# Patient Record
Sex: FEMALE | Race: OTHER | URBAN - METROPOLITAN AREA
[De-identification: names, ages, dates, MRNs, and addresses within clinical notes are randomized per-mention and may not be internally consistent; named-entity substitution may affect disease eponyms.]

---

## 2020-07-27 ENCOUNTER — TELEPHONE (OUTPATIENT)
Dept: FAMILY MEDICINE CLINIC | Facility: CLINIC | Age: 85
End: 2020-07-27

## 2020-07-27 ENCOUNTER — OFFICE VISIT (OUTPATIENT)
Dept: FAMILY MEDICINE CLINIC | Facility: CLINIC | Age: 85
End: 2020-07-27
Payer: COMMERCIAL

## 2020-07-27 VITALS
SYSTOLIC BLOOD PRESSURE: 126 MMHG | BODY MASS INDEX: 31.28 KG/M2 | DIASTOLIC BLOOD PRESSURE: 80 MMHG | HEIGHT: 58 IN | OXYGEN SATURATION: 98 % | RESPIRATION RATE: 18 BRPM | WEIGHT: 149 LBS | HEART RATE: 60 BPM

## 2020-07-27 DIAGNOSIS — M79.89 SWELLING OF LOWER LEG: ICD-10-CM

## 2020-07-27 DIAGNOSIS — R20.2 TINGLING OF BOTH FEET: ICD-10-CM

## 2020-07-27 DIAGNOSIS — R01.1 HEART MURMUR: ICD-10-CM

## 2020-07-27 DIAGNOSIS — H40.9 GLAUCOMA OF BOTH EYES, UNSPECIFIED GLAUCOMA TYPE: ICD-10-CM

## 2020-07-27 DIAGNOSIS — I49.9 CARDIAC ARRHYTHMIA, UNSPECIFIED CARDIAC ARRHYTHMIA TYPE: ICD-10-CM

## 2020-07-27 DIAGNOSIS — I10 ESSENTIAL HYPERTENSION: ICD-10-CM

## 2020-07-27 DIAGNOSIS — M17.0 OSTEOARTHRITIS OF BOTH KNEES, UNSPECIFIED OSTEOARTHRITIS TYPE: Primary | ICD-10-CM

## 2020-07-27 PROCEDURE — 99204 OFFICE O/P NEW MOD 45 MIN: CPT | Performed by: FAMILY MEDICINE

## 2020-07-27 PROCEDURE — 3008F BODY MASS INDEX DOCD: CPT | Performed by: FAMILY MEDICINE

## 2020-07-27 PROCEDURE — 3079F DIAST BP 80-89 MM HG: CPT | Performed by: FAMILY MEDICINE

## 2020-07-27 PROCEDURE — 3074F SYST BP LT 130 MM HG: CPT | Performed by: FAMILY MEDICINE

## 2020-07-27 RX ORDER — TELMISARTAN AND HYDROCHLORTHIAZIDE 80; 12.5 MG/1; MG/1
1 TABLET ORAL DAILY
COMMUNITY
End: 2020-08-06

## 2020-07-27 RX ORDER — DORZOLAMIDE HYDROCHLORIDE AND TIMOLOL MALEATE 20; 5 MG/ML; MG/ML
1 SOLUTION/ DROPS OPHTHALMIC 2 TIMES DAILY
COMMUNITY
End: 2020-08-06

## 2020-07-27 RX ORDER — NAPROXEN AND ESOMEPRAZOLE MAGNESIUM 500; 20 MG/1; MG/1
TABLET, DELAYED RELEASE ORAL
COMMUNITY

## 2020-07-27 RX ORDER — LATANOPROST 50 UG/ML
SOLUTION/ DROPS OPHTHALMIC NIGHTLY
COMMUNITY
End: 2020-08-06

## 2020-07-27 RX ORDER — CHLORAL HYDRATE 500 MG
CAPSULE ORAL
COMMUNITY

## 2020-07-27 NOTE — H&P
HPI:   Daly Sagastume is a 80year old female who presents to [facility] care. She moved here from [place]. Pt has b/l knee pain. This is a chronic problem that onset over one year ago. This problem has been worsening since onset.  Associated sympt (four) times daily as needed. 1 Tube 1      No past medical history on file. No past surgical history on file. No family history on file.    Social History:   Social History    Tobacco Use      Smoking status: Never Smoker      Smokeless tobacco: Never management. Take tylenol 1000mg twice daily as needed for arthritic pain  - Diclofenac Sodium 1 % Transdermal Gel; Apply 4 g topically 4 (four) times daily as needed. Dispense: 1 Tube; Refill: 1    2.  Cardiac arrhythmia, unspecified cardiac arrhythmia typ

## 2020-07-27 NOTE — PATIENT INSTRUCTIONS
Take tylenol 1000mg twice daily as needed for arthritic pain. Use compression stockings and elevate legs above heart  Elevate legs  Get ECHO in [place] for heart murmur likely aortic valve  Do exercises.   Try stopping piascledine to see how you do wi

## 2020-07-30 LAB
ABSOLUTE BASOPHILS: 41 CELLS/UL (ref 0–200)
ABSOLUTE EOSINOPHILS: 661 CELLS/UL (ref 15–500)
ABSOLUTE LYMPHOCYTES: 1640 CELLS/UL (ref 850–3900)
ABSOLUTE MONOCYTES: 443 CELLS/UL (ref 200–950)
ABSOLUTE NEUTROPHILS: 3115 CELLS/UL (ref 1500–7800)
ALBUMIN/GLOBULIN RATIO: 1.3 (CALC) (ref 1–2.5)
ALBUMIN: 3.4 G/DL (ref 3.6–5.1)
ALKALINE PHOSPHATASE: 81 U/L (ref 37–153)
ALT: 12 U/L (ref 6–29)
AST: 19 U/L (ref 10–35)
BASOPHILS: 0.7 %
BILIRUBIN, TOTAL: 0.4 MG/DL (ref 0.2–1.2)
BUN/CREATININE RATIO: 21 (CALC) (ref 6–22)
BUN: 29 MG/DL (ref 7–25)
CALCIUM: 8.4 MG/DL (ref 8.6–10.4)
CARBON DIOXIDE: 27 MMOL/L (ref 20–32)
CHLORIDE: 108 MMOL/L (ref 98–110)
CREATININE: 1.37 MG/DL (ref 0.6–0.88)
EGFR IF AFRICN AM: 40 ML/MIN/1.73M2
EGFR IF NONAFRICN AM: 35 ML/MIN/1.73M2
EOSINOPHILS: 11.2 %
GLOBULIN: 2.6 G/DL (CALC) (ref 1.9–3.7)
GLUCOSE: 92 MG/DL (ref 65–99)
HEMATOCRIT: 33.6 % (ref 35–45)
HEMOGLOBIN: 10.8 G/DL (ref 11.7–15.5)
LYMPHOCYTES: 27.8 %
MCH: 29.9 PG (ref 27–33)
MCHC: 32.1 G/DL (ref 32–36)
MCV: 93.1 FL (ref 80–100)
MONOCYTES: 7.5 %
MPV: 11 FL (ref 7.5–12.5)
NEUTROPHILS: 52.8 %
PLATELET COUNT: 216 THOUSAND/UL (ref 140–400)
POTASSIUM: 4.4 MMOL/L (ref 3.5–5.3)
PROTEIN, TOTAL: 6 G/DL (ref 6.1–8.1)
RDW: 12.2 % (ref 11–15)
RED BLOOD CELL COUNT: 3.61 MILLION/UL (ref 3.8–5.1)
SODIUM: 141 MMOL/L (ref 135–146)
WHITE BLOOD CELL COUNT: 5.9 THOUSAND/UL (ref 3.8–10.8)

## 2020-08-05 ENCOUNTER — VIRTUAL PHONE E/M (OUTPATIENT)
Dept: FAMILY MEDICINE CLINIC | Facility: CLINIC | Age: 85
End: 2020-08-05
Payer: COMMERCIAL

## 2020-08-05 DIAGNOSIS — D64.9 LOW HEMOGLOBIN: Primary | ICD-10-CM

## 2020-08-05 DIAGNOSIS — I10 ESSENTIAL HYPERTENSION: ICD-10-CM

## 2020-08-05 DIAGNOSIS — H40.9 GLAUCOMA OF BOTH EYES, UNSPECIFIED GLAUCOMA TYPE: ICD-10-CM

## 2020-08-05 DIAGNOSIS — N18.30 STAGE 3 CHRONIC KIDNEY DISEASE (HCC): ICD-10-CM

## 2020-08-05 PROCEDURE — 99213 OFFICE O/P EST LOW 20 MIN: CPT | Performed by: FAMILY MEDICINE

## 2020-08-05 NOTE — PROGRESS NOTES
HPI:    Patient ID: Miya Rivero is a 80year old female. Pts blood work was completed on 7/29/2020. This showed low hemoglobin. Pts daughter believes that this is a chronic problem. Iron levels were not checked in this round of blood work.  Pt is autumn disease ([address]  Pt should compare recent levels to past testing when she returns to [place]. She should continue to stay off of vimovo. Pt is advised to avoid NSAIDs  Due to her kidney dx.      Meds This Visit:  Requested Prescriptions      No prescript

## 2020-08-06 NOTE — TELEPHONE ENCOUNTER
Please call patient relative and figure out her correct med dosing and refill what she needs x 90 days with 1 refill except the vimovo.

## 2020-08-06 NOTE — TELEPHONE ENCOUNTER
Spoke with Blanca daughter reviewed patient's medication:  Telpres- HCTZ 80-12.5mg daily. arycor/Amiodarone hydrochloride - 100mg, 1/2 tablet daily. myoprin/Aspirin 100mg, daily.   cosopt/Dorzolamide-timolol- eye drop 5ml, one drop in the morning, one time

## 2020-08-07 ENCOUNTER — TELEPHONE (OUTPATIENT)
Dept: FAMILY MEDICINE CLINIC | Facility: CLINIC | Age: 85
End: 2020-08-07

## 2020-08-07 RX ORDER — AMIODARONE HYDROCHLORIDE 100 MG/1
50 TABLET ORAL DAILY
Qty: 45 TABLET | Refills: 1 | Status: SHIPPED | OUTPATIENT
Start: 2020-08-07

## 2020-08-07 RX ORDER — AMIODARONE HYDROCHLORIDE 100 MG/1
50 TABLET ORAL DAILY
Qty: 45 TABLET | Refills: 1 | Status: SHIPPED | OUTPATIENT
Start: 2020-08-07 | End: 2020-08-07

## 2020-08-07 RX ORDER — TELMISARTAN AND HYDROCHLORTHIAZIDE 80; 12.5 MG/1; MG/1
1 TABLET ORAL DAILY
Qty: 90 TABLET | Refills: 1 | Status: SHIPPED | OUTPATIENT
Start: 2020-08-07

## 2020-08-07 RX ORDER — DORZOLAMIDE HYDROCHLORIDE AND TIMOLOL MALEATE 20; 5 MG/ML; MG/ML
1 SOLUTION/ DROPS OPHTHALMIC 2 TIMES DAILY
Qty: 1 BOTTLE | Refills: 1 | Status: SHIPPED | OUTPATIENT
Start: 2020-08-07 | End: 2020-08-07

## 2020-08-07 RX ORDER — DORZOLAMIDE HYDROCHLORIDE AND TIMOLOL MALEATE 20; 5 MG/ML; MG/ML
1 SOLUTION/ DROPS OPHTHALMIC 2 TIMES DAILY
Qty: 1 BOTTLE | Refills: 1 | Status: SHIPPED | OUTPATIENT
Start: 2020-08-07

## 2020-08-07 RX ORDER — LATANOPROST 50 UG/ML
1 SOLUTION/ DROPS OPHTHALMIC NIGHTLY
Qty: 1 BOTTLE | Refills: 1 | Status: SHIPPED | OUTPATIENT
Start: 2020-08-07

## 2020-08-07 RX ORDER — LATANOPROST 50 UG/ML
1 SOLUTION/ DROPS OPHTHALMIC NIGHTLY
Qty: 1 BOTTLE | Refills: 1 | Status: SHIPPED | OUTPATIENT
Start: 2020-08-07 | End: 2020-08-07

## 2020-08-07 RX ORDER — TELMISARTAN AND HYDROCHLORTHIAZIDE 80; 12.5 MG/1; MG/1
1 TABLET ORAL DAILY
Qty: 90 TABLET | Refills: 1 | Status: SHIPPED | OUTPATIENT
Start: 2020-08-07 | End: 2020-08-07

## 2020-08-07 NOTE — TELEPHONE ENCOUNTER
Patient does not have prescription coverage. Daughter would like to  written scripts so she can try [facility] or shop for cheaper prices. Please call when ready.

## 2020-10-05 ENCOUNTER — TELEPHONE (OUTPATIENT)
Dept: FAMILY MEDICINE CLINIC | Facility: CLINIC | Age: 85
End: 2020-10-05

## 2020-10-05 NOTE — TELEPHONE ENCOUNTER
Patients daughter is calling. Requesting a covid test for purpose of patient is scheduled to leave on Saturday 10/10 for [place]. Please advise.

## 2021-02-06 DIAGNOSIS — Z23 NEED FOR VACCINATION: ICD-10-CM
